# Patient Record
Sex: FEMALE | Race: WHITE | URBAN - METROPOLITAN AREA
[De-identification: names, ages, dates, MRNs, and addresses within clinical notes are randomized per-mention and may not be internally consistent; named-entity substitution may affect disease eponyms.]

---

## 2020-11-13 ENCOUNTER — OFFICE VISIT (OUTPATIENT)
Dept: ORTHOPEDIC SURGERY | Age: 35
End: 2020-11-13
Payer: COMMERCIAL

## 2020-11-13 VITALS — BODY MASS INDEX: 18.48 KG/M2 | WEIGHT: 115 LBS | HEIGHT: 66 IN

## 2020-11-13 PROBLEM — M77.42 METATARSALGIA OF LEFT FOOT: Status: ACTIVE | Noted: 2020-11-13

## 2020-11-13 PROCEDURE — L3040 FT ARCH SUPRT PREMOLD LONGIT: HCPCS | Performed by: ORTHOPAEDIC SURGERY

## 2020-11-13 PROCEDURE — G8484 FLU IMMUNIZE NO ADMIN: HCPCS | Performed by: ORTHOPAEDIC SURGERY

## 2020-11-13 PROCEDURE — 4004F PT TOBACCO SCREEN RCVD TLK: CPT | Performed by: ORTHOPAEDIC SURGERY

## 2020-11-13 PROCEDURE — G8427 DOCREV CUR MEDS BY ELIG CLIN: HCPCS | Performed by: ORTHOPAEDIC SURGERY

## 2020-11-13 PROCEDURE — 99203 OFFICE O/P NEW LOW 30 MIN: CPT | Performed by: ORTHOPAEDIC SURGERY

## 2020-11-13 PROCEDURE — G8420 CALC BMI NORM PARAMETERS: HCPCS | Performed by: ORTHOPAEDIC SURGERY

## 2020-11-13 NOTE — PROGRESS NOTES
Chief Complaint    New Patient (Left Foot - MTP P+ on plantar surface)      History of Present Illness:  Jimmy Bland is a 30 y.o. female for evaluation of chief complaint left forefoot pain. This started approximately 3 months ago when it was gradual onset. Symptoms are worse with activity but not always got a little bit better with rest but after taking 2 weeks of exercising she really see too much improvement. Patient is an avid runner and . Patient endorses forefoot pain but denies numbness tingling. Treatment to date includes: None      Medical History:  Patient's medications, allergies, past medical, surgical, social and family histories were reviewed and updated as appropriate. Review of Systems:  Relevant review of systems reviewed and available in the patient's chart. They are negative or noncontributory except as per HPI. Vital Signs: There were no vitals filed for this visit. General: well-developed, well-nourished; adequately groomed  CV: RR  RESP: Respirations unlabored on RA  Skin: No rashes or ulcerations appreciated  Psych: appropriate mood and affect      Musculoskeletal:    Extremity: Left lower    Inspection: No swelling or ecchymosis, higher to foot    Palpation: There is to palpation about the fibular sesamoid as well as the second and third MTP joints. Range of Motion: Tight gastrocs and hamstring    Stability: Stable    Strength: 5 out of 5 plantar flexion dorsiflexion inversion eversion    Special Tests: Positive Silfverskiold test    Gait: Normal    Pulse/perfusion: 2+ dorsalis pedis pulse, foot warm and well perfused    Neurological:    Sensation: Sensation intact to light touch throughout     Functional: the patient has good coordination and balance     Radiology:     X-rays obtained and reviewed in office:  Views AP lateral oblique weightbearing left foot  Impression she has relatively long second third metatarsals.   The fibular sesamoid appears to be bipartite without evidence of clear fracture. Assessment : 61-year-old female with fibular sesamoiditis and metatarsal overload    Impression:  Encounter Diagnoses   Name Primary?  Left foot pain Yes    Metatarsalgia of left foot        Office Procedures:  Orders Placed This Encounter   Procedures    XR FOOT LEFT (MIN 3 VIEWS)     Standing Status:   Future     Number of Occurrences:   1     Standing Expiration Date:   11/13/2021    Powerstep Protech Full Length Insert     Patient was prescribed Powerstep Protech Full Length Inserts. The left FOOT will require stabilization / support from this semi-rigid / rigid orthosis to improve their function. The orthosis will assist in protecting the affected area, provide functional support and facilitate healing. The patient was educated and fit by a healthcare professional with expert knowledge and specialization in brace application while under the direct supervision of the treating physician. Verbal and written instructions for the use of and application of this item were provided. They were instructed to contact the office immediately should the brace result in increased pain, decreased sensation, increased swelling or worsening of the condition. Treatment Plan:      I had a nice discussion the patient today. I do not think that there is any evidence to support stress fracture or stress reaction at this point. She does have tight heel cords. To that end I recommended heel cord stretching program, [employer] shoes for activities, arch support over-the-counter orthotics, and scheduled anti-inflammatories for 2 weeks to help get over the inflammatory portion of this. I would like to see her back in 6 weeks time to see how she is doing. If she is doing very well she may call and cancel.